# Patient Record
Sex: FEMALE | Race: WHITE
[De-identification: names, ages, dates, MRNs, and addresses within clinical notes are randomized per-mention and may not be internally consistent; named-entity substitution may affect disease eponyms.]

---

## 2017-07-18 ENCOUNTER — HOSPITAL ENCOUNTER (OUTPATIENT)
Dept: HOSPITAL 19 - WSOH | Age: 37
End: 2017-07-18
Attending: INTERNAL MEDICINE

## 2017-07-18 DIAGNOSIS — Z02.89: Primary | ICD-10-CM

## 2017-09-28 ENCOUNTER — HOSPITAL ENCOUNTER (OUTPATIENT)
Dept: HOSPITAL 19 - WSOH | Age: 37
End: 2017-09-28
Attending: INTERNAL MEDICINE

## 2017-09-28 DIAGNOSIS — Z02.89: Primary | ICD-10-CM

## 2019-12-05 ENCOUNTER — HOSPITAL ENCOUNTER (OUTPATIENT)
Dept: HOSPITAL 19 - LDRO | Age: 39
Discharge: ANOTHER HOSPITAL | End: 2019-12-05
Attending: OBSTETRICS & GYNECOLOGY
Payer: SELF-PAY

## 2019-12-05 VITALS — DIASTOLIC BLOOD PRESSURE: 58 MMHG | HEART RATE: 90 BPM | SYSTOLIC BLOOD PRESSURE: 104 MMHG

## 2019-12-05 VITALS — HEIGHT: 65 IN | BODY MASS INDEX: 34.23 KG/M2 | WEIGHT: 205.47 LBS

## 2019-12-05 VITALS — DIASTOLIC BLOOD PRESSURE: 70 MMHG | HEART RATE: 86 BPM | SYSTOLIC BLOOD PRESSURE: 112 MMHG

## 2019-12-05 VITALS — HEART RATE: 90 BPM | DIASTOLIC BLOOD PRESSURE: 81 MMHG | SYSTOLIC BLOOD PRESSURE: 150 MMHG | TEMPERATURE: 98.3 F

## 2019-12-05 VITALS — SYSTOLIC BLOOD PRESSURE: 134 MMHG | DIASTOLIC BLOOD PRESSURE: 75 MMHG | TEMPERATURE: 98.3 F | HEART RATE: 85 BPM

## 2019-12-05 VITALS — SYSTOLIC BLOOD PRESSURE: 126 MMHG | DIASTOLIC BLOOD PRESSURE: 82 MMHG | HEART RATE: 93 BPM

## 2019-12-05 VITALS — HEART RATE: 110 BPM | DIASTOLIC BLOOD PRESSURE: 93 MMHG | SYSTOLIC BLOOD PRESSURE: 131 MMHG

## 2019-12-05 DIAGNOSIS — Z3A.33: ICD-10-CM

## 2019-12-05 DIAGNOSIS — O46.93: Primary | ICD-10-CM

## 2019-12-05 NOTE — NUR
PT TO UNIT WITH COMPLAINTS OF VAGINAL BLEEDING WITH CLOTS. PT TO UNIT
AMBULATORY AT 33.2 WEEKS, G4L2. ORIENTED TO ROOM, EFMX2, VS OBTAINED.

## 2019-12-05 NOTE — NUR
LOADING DOSE OF MAGNESIUM STARTED PER DR. MCWILLIAMS AT 2025. FOLLOWING DR MCWILLIAMS
CALLING [facility] IN [place] LOADING DOSE OF MAGNESIUM WAS DC'D PER THE
RECEIVING DOCTOR'S REQUEST. MAGNESIUM STOPPED AT 2030.

## 2019-12-05 NOTE — NUR
EMS HERE FOR TRANSPORT TO [facility]. ZITHROMAX IV AND LR CONTINUES TO RUN.
PT TO STRETCHER. OFF UNIT WITH EMS AT 2130.

## 2019-12-05 NOTE — NUR
MONITORING RESUMED FOLLOWING POSITIVE AMNISURE RESULTS. DR. MCWILLIAMS IN ROOM TO
DISCUSS TRANSFER TO [facility] DUE TO SROM AT 33.2 WEEKS. QUESTIONS
ENCOURAGED AND ANSWERED, PT VERBALIZED UNDERSTANDING.

## 2019-12-05 NOTE — NUR
DR. MCWILLIAMS IN ROOM TO EVALUATE. NO VAGINAL BLEEDING VISABLE AT THIS TIME. PT
STATES THAT AT APPROXIMATELY 1745 THIS EVENING SHE WOKE UP FROM A NAP AND FELT
A GUSH, UNDERWEAR WERE SATURATED AND A SPOT NOTED ON THE BED SHEETS. PT STATES
SHE WENT TO THE BATHROOM AND AND A HALF DOLLAR SIZED CLOT CAME OUT AND SHE
SAW BRIGHT RED BLOOD DRIPPING INTO THE TOILET. PT HAD A PREVIA BUT HAS NOW
IMPROVED TO LOW LYING PLACENTA. DR. MCWILLIAMS PERFORMS SVE, 1/60/-3, SMALL AMOUNT
OF BROWN BLOOD NOTED TO DR MCWILLIAMS'S GLOVE. DR MCWILLIAMS UNSURE IF SROM HAS
OCCURRED. AMNISURE AND BETAMETHASONE ORDERED AT THIS TIME.

## 2019-12-10 ENCOUNTER — HOSPITAL ENCOUNTER (OUTPATIENT)
Dept: HOSPITAL 19 - LDRO | Age: 39
Discharge: HOME | End: 2019-12-10
Attending: OBSTETRICS & GYNECOLOGY
Payer: COMMERCIAL

## 2019-12-10 VITALS — HEART RATE: 90 BPM | DIASTOLIC BLOOD PRESSURE: 79 MMHG | TEMPERATURE: 97.7 F | SYSTOLIC BLOOD PRESSURE: 119 MMHG

## 2019-12-10 VITALS — BODY MASS INDEX: 33.57 KG/M2 | WEIGHT: 201.5 LBS | HEIGHT: 65 IN

## 2019-12-10 VITALS — TEMPERATURE: 97.7 F | SYSTOLIC BLOOD PRESSURE: 119 MMHG | HEART RATE: 90 BPM | DIASTOLIC BLOOD PRESSURE: 79 MMHG

## 2019-12-10 DIAGNOSIS — O46.93: Primary | ICD-10-CM

## 2019-12-10 DIAGNOSIS — Z3A.34: ICD-10-CM

## 2019-12-10 NOTE — NUR
Labor precautions and kick counts reviewed. Patient encouraged to rest and
orally hydrate. Reviewed importance of returning with noted bleeding or
leaking of fluid. Patient denies questions and agrees. Has follow up appt
tomorrow with . Leaves ambulatory at this time.

## 2019-12-10 NOTE — NUR
Patient arrives ambulatory with spouse with concerns of vaginal bleeding this
morning. Patient is a G4L2 at 34.0 weeks gestation. Patient was recently
shipped to [facility] on Thursday for SROM. Patient states she was kept there and
discharged yesterday after two negative Amnisure results. Patient states she
had a moderate amount of bloody mucus noted on pad last night, and then this
morning had "a gush of bleeding". Patient shows RN  photo of pad from this
morning, pad noted to be saturated entirely in length with
light red/pink fluid. Patient states that after this initial bleeding episode
she has not noticed any more bleeding. Patient denies clear fluid/leaking and
denies contractions or cramping. Reports normal fetal movement. Patient
changes into gown, EFM explained and placed. VSS. Prior orders called to unit
for Amnisure per .
 
1120- Amniotrace performed, negative. No blood noted on test. Current peripad
does not have any blood noted.
 
1125- Amnisure performed per order and protocol. Sent to lab.
 
1135-  notified and updated on patient. Reviewed patient history and
complaint from this morning, reviewed observations on pad from photo. Reviewed
FHR strip and toco. Requested orders for SVE as patient has low lying placenta
noted in prenatal records. Orders for SVE recieved and update physician when
Amnisure is resulted. Patient udpated on plan of care. SVE unchanged from last
exam. Repositioned WL and updated on plan of care. Patient denies need.

## 2019-12-10 NOTE — NUR
Reactive FHR strip, no contractions noted. Patient denies contractions or
bleeding. Taken off EFM per order.

## 2019-12-10 NOTE — NUR
Patient updated on plan of care after discussing Amnisure results and
physician orders. No bleeding noted on current peripad. Patient continues to
deny contractions or concerns. Water given per orders to orally hydrate and
will monitor over another hour.

## 2019-12-23 ENCOUNTER — HOSPITAL ENCOUNTER (INPATIENT)
Dept: HOSPITAL 19 - LDRO | Age: 39
LOS: 2 days | Discharge: HOME | End: 2019-12-25
Attending: OBSTETRICS & GYNECOLOGY | Admitting: OBSTETRICS & GYNECOLOGY
Payer: COMMERCIAL

## 2019-12-23 VITALS — WEIGHT: 201.5 LBS | BODY MASS INDEX: 33.57 KG/M2 | HEIGHT: 65 IN

## 2019-12-23 VITALS — DIASTOLIC BLOOD PRESSURE: 63 MMHG | SYSTOLIC BLOOD PRESSURE: 106 MMHG | HEART RATE: 86 BPM

## 2019-12-23 VITALS — HEART RATE: 96 BPM | TEMPERATURE: 98.2 F | SYSTOLIC BLOOD PRESSURE: 123 MMHG | DIASTOLIC BLOOD PRESSURE: 73 MMHG

## 2019-12-23 VITALS — SYSTOLIC BLOOD PRESSURE: 100 MMHG | HEART RATE: 80 BPM | DIASTOLIC BLOOD PRESSURE: 63 MMHG

## 2019-12-23 VITALS — DIASTOLIC BLOOD PRESSURE: 62 MMHG | SYSTOLIC BLOOD PRESSURE: 121 MMHG | HEART RATE: 78 BPM

## 2019-12-23 VITALS — DIASTOLIC BLOOD PRESSURE: 57 MMHG | SYSTOLIC BLOOD PRESSURE: 102 MMHG | HEART RATE: 97 BPM

## 2019-12-23 VITALS — SYSTOLIC BLOOD PRESSURE: 104 MMHG | DIASTOLIC BLOOD PRESSURE: 67 MMHG | HEART RATE: 79 BPM

## 2019-12-23 VITALS — DIASTOLIC BLOOD PRESSURE: 45 MMHG | HEART RATE: 84 BPM | SYSTOLIC BLOOD PRESSURE: 100 MMHG

## 2019-12-23 VITALS — HEART RATE: 89 BPM | DIASTOLIC BLOOD PRESSURE: 64 MMHG | SYSTOLIC BLOOD PRESSURE: 112 MMHG | TEMPERATURE: 98.2 F

## 2019-12-23 VITALS — SYSTOLIC BLOOD PRESSURE: 106 MMHG | DIASTOLIC BLOOD PRESSURE: 60 MMHG | HEART RATE: 100 BPM

## 2019-12-23 VITALS — TEMPERATURE: 97.9 F | HEART RATE: 82 BPM | DIASTOLIC BLOOD PRESSURE: 74 MMHG | SYSTOLIC BLOOD PRESSURE: 92 MMHG

## 2019-12-23 VITALS — HEART RATE: 91 BPM | SYSTOLIC BLOOD PRESSURE: 94 MMHG | DIASTOLIC BLOOD PRESSURE: 49 MMHG

## 2019-12-23 VITALS — SYSTOLIC BLOOD PRESSURE: 106 MMHG | DIASTOLIC BLOOD PRESSURE: 64 MMHG | HEART RATE: 82 BPM

## 2019-12-23 VITALS — HEART RATE: 78 BPM | DIASTOLIC BLOOD PRESSURE: 67 MMHG | SYSTOLIC BLOOD PRESSURE: 106 MMHG

## 2019-12-23 VITALS — DIASTOLIC BLOOD PRESSURE: 63 MMHG | SYSTOLIC BLOOD PRESSURE: 109 MMHG | HEART RATE: 88 BPM

## 2019-12-23 VITALS — HEART RATE: 97 BPM | DIASTOLIC BLOOD PRESSURE: 59 MMHG | SYSTOLIC BLOOD PRESSURE: 93 MMHG

## 2019-12-23 VITALS — SYSTOLIC BLOOD PRESSURE: 108 MMHG | HEART RATE: 83 BPM | DIASTOLIC BLOOD PRESSURE: 65 MMHG

## 2019-12-23 VITALS — TEMPERATURE: 98.1 F | SYSTOLIC BLOOD PRESSURE: 118 MMHG | DIASTOLIC BLOOD PRESSURE: 73 MMHG | HEART RATE: 92 BPM

## 2019-12-23 VITALS — DIASTOLIC BLOOD PRESSURE: 67 MMHG | SYSTOLIC BLOOD PRESSURE: 109 MMHG | HEART RATE: 85 BPM

## 2019-12-23 VITALS — SYSTOLIC BLOOD PRESSURE: 125 MMHG | HEART RATE: 83 BPM | DIASTOLIC BLOOD PRESSURE: 64 MMHG

## 2019-12-23 VITALS — DIASTOLIC BLOOD PRESSURE: 40 MMHG | SYSTOLIC BLOOD PRESSURE: 92 MMHG | HEART RATE: 82 BPM

## 2019-12-23 VITALS — HEART RATE: 67 BPM | DIASTOLIC BLOOD PRESSURE: 53 MMHG | SYSTOLIC BLOOD PRESSURE: 97 MMHG

## 2019-12-23 VITALS — HEART RATE: 96 BPM | SYSTOLIC BLOOD PRESSURE: 123 MMHG | DIASTOLIC BLOOD PRESSURE: 73 MMHG

## 2019-12-23 VITALS — SYSTOLIC BLOOD PRESSURE: 108 MMHG | TEMPERATURE: 98.3 F | HEART RATE: 96 BPM | DIASTOLIC BLOOD PRESSURE: 78 MMHG

## 2019-12-23 VITALS — DIASTOLIC BLOOD PRESSURE: 46 MMHG | HEART RATE: 82 BPM | SYSTOLIC BLOOD PRESSURE: 131 MMHG

## 2019-12-23 VITALS — DIASTOLIC BLOOD PRESSURE: 61 MMHG | TEMPERATURE: 97.6 F | HEART RATE: 76 BPM | SYSTOLIC BLOOD PRESSURE: 105 MMHG

## 2019-12-23 VITALS — SYSTOLIC BLOOD PRESSURE: 105 MMHG | DIASTOLIC BLOOD PRESSURE: 57 MMHG | TEMPERATURE: 98 F | HEART RATE: 79 BPM

## 2019-12-23 VITALS — DIASTOLIC BLOOD PRESSURE: 98 MMHG | SYSTOLIC BLOOD PRESSURE: 124 MMHG | HEART RATE: 87 BPM

## 2019-12-23 VITALS — SYSTOLIC BLOOD PRESSURE: 105 MMHG | HEART RATE: 97 BPM | DIASTOLIC BLOOD PRESSURE: 49 MMHG

## 2019-12-23 VITALS — SYSTOLIC BLOOD PRESSURE: 58 MMHG | HEART RATE: 127 BPM | DIASTOLIC BLOOD PRESSURE: 37 MMHG

## 2019-12-23 DIAGNOSIS — O44.13: Primary | ICD-10-CM

## 2019-12-23 DIAGNOSIS — Z3A.35: ICD-10-CM

## 2019-12-23 DIAGNOSIS — Z23: ICD-10-CM

## 2019-12-23 DIAGNOSIS — E03.9: ICD-10-CM

## 2019-12-23 LAB
BASOPHILS # BLD: 0 10*3/UL (ref 0–0.2)
BASOPHILS # BLD: 0 10*3/UL (ref 0–0.2)
BASOPHILS NFR BLD AUTO: 0.2 % (ref 0–2)
BASOPHILS NFR BLD AUTO: 0.3 % (ref 0–2)
EOSINOPHIL # BLD: 0 10*3/UL (ref 0–0.7)
EOSINOPHIL # BLD: 0.1 10*3/UL (ref 0–0.7)
EOSINOPHIL NFR BLD: 0.1 % (ref 0–4)
EOSINOPHIL NFR BLD: 1.2 % (ref 0–4)
ERYTHROCYTE [DISTWIDTH] IN BLOOD BY AUTOMATED COUNT: 13.6 % (ref 11.5–14.5)
ERYTHROCYTE [DISTWIDTH] IN BLOOD BY AUTOMATED COUNT: 13.7 % (ref 11.5–14.5)
GRANULOCYTES # BLD AUTO: 63.1 % (ref 42.2–75.2)
GRANULOCYTES # BLD AUTO: 85.5 % (ref 42.2–75.2)
HCT VFR BLD AUTO: 33.1 % (ref 37–47)
HCT VFR BLD AUTO: 36.8 % (ref 37–47)
HGB BLD-MCNC: 10.9 G/DL (ref 12.5–16)
HGB BLD-MCNC: 12.3 G/DL (ref 12.5–16)
LYMPHOCYTES # BLD: 1.4 10*3/UL (ref 1.2–3.4)
LYMPHOCYTES # BLD: 2.3 10*3/UL (ref 1.2–3.4)
LYMPHOCYTES NFR BLD: 25.5 % (ref 20–51)
LYMPHOCYTES NFR BLD: 8.7 % (ref 20–51)
MCH RBC QN AUTO: 30 PG (ref 27–31)
MCH RBC QN AUTO: 30 PG (ref 27–31)
MCHC RBC AUTO-ENTMCNC: 33 G/DL (ref 33–37)
MCHC RBC AUTO-ENTMCNC: 33 G/DL (ref 33–37)
MCV RBC AUTO: 90 FL (ref 80–100)
MCV RBC AUTO: 91 FL (ref 80–100)
MONOCYTES # BLD: 0.8 10*3/UL (ref 0.1–0.6)
MONOCYTES # BLD: 0.8 10*3/UL (ref 0.1–0.6)
MONOCYTES NFR BLD AUTO: 4.7 % (ref 1.7–9.3)
MONOCYTES NFR BLD AUTO: 8.7 % (ref 1.7–9.3)
NEUTROPHILS # BLD: 13.5 10*3/UL (ref 1.4–6.5)
NEUTROPHILS # BLD: 5.7 10*3/UL (ref 1.4–6.5)
PLATELET # BLD AUTO: 230 K/MM3 (ref 130–400)
PLATELET # BLD AUTO: 255 K/MM3 (ref 130–400)
PMV BLD AUTO: 9.6 FL (ref 7.4–10.4)
PMV BLD AUTO: 9.9 FL (ref 7.4–10.4)
RBC # BLD AUTO: 3.62 M/MM3 (ref 4.1–5.3)
RBC # BLD AUTO: 4.11 M/MM3 (ref 4.1–5.3)

## 2019-12-23 NOTE — NUR
Dr. [place] consulted with [facility] in [place], [place]. Decision made for delivery. Pt
updated on POC. Prepped for  delivery.

## 2019-12-23 NOTE — NUR
0600- PATIENT'S RODOLFO PAD/CHUX CHECKED AT THIS TIME FOR VAGINAL BLEEDING. NO
VAGINAL BLEEDING NOTED ON UNDER CHUX PAD ON PATIENT'S BED. PATIENT C/O MILD
LOWER ABDOMINAL CRAMPING, RATING PAIN A 2 ON A PAIN SCALE OF 0-10.

## 2019-12-24 VITALS — TEMPERATURE: 97.8 F | HEART RATE: 79 BPM | DIASTOLIC BLOOD PRESSURE: 77 MMHG | SYSTOLIC BLOOD PRESSURE: 117 MMHG

## 2019-12-24 VITALS — SYSTOLIC BLOOD PRESSURE: 111 MMHG | HEART RATE: 84 BPM | DIASTOLIC BLOOD PRESSURE: 74 MMHG | TEMPERATURE: 97.7 F

## 2019-12-24 VITALS — SYSTOLIC BLOOD PRESSURE: 99 MMHG | DIASTOLIC BLOOD PRESSURE: 68 MMHG | HEART RATE: 88 BPM | TEMPERATURE: 97.7 F

## 2019-12-24 VITALS — HEART RATE: 85 BPM | TEMPERATURE: 98.3 F | SYSTOLIC BLOOD PRESSURE: 111 MMHG | DIASTOLIC BLOOD PRESSURE: 71 MMHG

## 2019-12-24 VITALS — DIASTOLIC BLOOD PRESSURE: 60 MMHG | SYSTOLIC BLOOD PRESSURE: 104 MMHG | TEMPERATURE: 97.6 F | HEART RATE: 82 BPM

## 2019-12-24 LAB
BASOPHILS # BLD: 0 10*3/UL (ref 0–0.2)
BASOPHILS NFR BLD AUTO: 0.2 % (ref 0–2)
EOSINOPHIL # BLD: 0.1 10*3/UL (ref 0–0.7)
EOSINOPHIL NFR BLD: 0.7 % (ref 0–4)
ERYTHROCYTE [DISTWIDTH] IN BLOOD BY AUTOMATED COUNT: 13.8 % (ref 11.5–14.5)
GRANULOCYTES # BLD AUTO: 75.6 % (ref 42.2–75.2)
HCT VFR BLD AUTO: 28.9 % (ref 37–47)
HGB BLD-MCNC: 9.6 G/DL (ref 12.5–16)
LYMPHOCYTES # BLD: 1.5 10*3/UL (ref 1.2–3.4)
LYMPHOCYTES NFR BLD: 15.4 % (ref 20–51)
MCH RBC QN AUTO: 30 PG (ref 27–31)
MCHC RBC AUTO-ENTMCNC: 33 G/DL (ref 33–37)
MCV RBC AUTO: 91 FL (ref 80–100)
MONOCYTES # BLD: 0.7 10*3/UL (ref 0.1–0.6)
MONOCYTES NFR BLD AUTO: 7.4 % (ref 1.7–9.3)
NEUTROPHILS # BLD: 7.1 10*3/UL (ref 1.4–6.5)
PLATELET # BLD AUTO: 204 K/MM3 (ref 130–400)
PMV BLD AUTO: 9.8 FL (ref 7.4–10.4)
RBC # BLD AUTO: 3.18 M/MM3 (ref 4.1–5.3)

## 2019-12-25 VITALS — TEMPERATURE: 97.9 F | SYSTOLIC BLOOD PRESSURE: 106 MMHG | HEART RATE: 82 BPM | DIASTOLIC BLOOD PRESSURE: 65 MMHG

## 2019-12-25 NOTE — NUR
Rests in bed, alert. 0740 Ibuprofen 600 mg, percocet 5/325 mg one given per
request and as ordered. Denies any other needs at this time.

## 2019-12-31 ENCOUNTER — HOSPITAL ENCOUNTER (OUTPATIENT)
Dept: HOSPITAL 19 - LAC | Age: 39
End: 2019-12-31
Attending: OBSTETRICS & GYNECOLOGY
Payer: COMMERCIAL

## 2019-12-31 NOTE — NUR
Pt, Aleksandra Andino, presents to walk in breastfeeding clinic with 8 day old
baby girl, Arabella Andino, for a breastfeeding evaluation and weight check.
 
Arabella was born on [birth year], she was 36 weeks gestation by dates and 34 weeks
gestation on exam.
 
Birth wt: 6#2.8oz (2800 gms)
DC wt 12/25/19: 5#15oz
12/27/19 wt at [facility] appt: 5#11oz
Today's weight: 5#13.1oz (2638 gms), for a 5% loss from birth and a gain of
2oz over 4 days.
 
Pt reports Arabella eats an average of every 2 hours, taking an average of 60ml
of EBM.  Pt cannot get her to drink more per session to try spacing feedings
out a little.  Pt offers the breast 3-4 times daily.
Output is 6+ voids and 4+ yellow seedy stools per day.
Pt pumps q feeding collecting a minimum of 160 ml up to 240 ml.
 
Pt is coached on baby's alignment to breast and how to get Arabella to open
wider.  Once latched on the left breast she remains for about 15 min. and has
a gain of 34gms (1.1oz) per post feed weight.
 
Because of slow weight gain Dr. Guzman was consulted about fortifying EBM.
Orders received to fortify EBM to 22 calories per ounce.  Pt provided mixing
instructions.
 
POC:  Fortify EBM as directed, continue with feeding times and amounts.  May
offer breast x2 daily and supplement 1oz fortified EBM after.  Continue
pumping.
 
F/U:  Dr. Shane or Dr. Guzman on Monday Jan. 6, 2020.  Anticipate at clinic next
week as well.  Pt verbalizes understanding, questions invited and answered.

## 2020-01-07 ENCOUNTER — HOSPITAL ENCOUNTER (OUTPATIENT)
Dept: HOSPITAL 19 - LDRO | Age: 40
End: 2020-01-07
Attending: OBSTETRICS & GYNECOLOGY
Payer: COMMERCIAL

## 2020-01-07 DIAGNOSIS — Z71.89: ICD-10-CM

## 2020-01-07 NOTE — NUR
Pt, Aleksandra Andino, presents to walk-in breastfeeding clinic with 2 week
old baby girl, Arabella Andino, for a weight check.
 
Arabella was born on [birth year] and weighed 6# 2.8oz.
At clinic one week ago she weighed 5# 13.1oz.
 
Pt was advised per Dr. Guzman to fortify EBM to 22 calories per ounce.
Instructions were provided.
 
Today Arabella weighs 6#8.9oz (2972 gms), for a gain of 11.8oz.
 
Pt advised to continue feeding plan until Arabella is seen next week by Dr. Shane
in clinic.
 
Anticipate breastfeeding evaluation at walk-in clinic next week.
 
Questions invited and answered.